# Patient Record
Sex: MALE | Race: WHITE | NOT HISPANIC OR LATINO | URBAN - METROPOLITAN AREA
[De-identification: names, ages, dates, MRNs, and addresses within clinical notes are randomized per-mention and may not be internally consistent; named-entity substitution may affect disease eponyms.]

---

## 2018-01-08 NOTE — PATIENT DISCUSSION
(H35.361) Drusen (degenerative) of macula, right eye - Assesment : Examination revealed Drusen OD. - Plan : Monitor for changes. Advised patient to call our office with decreased vision or increased symptoms.

## 2018-01-08 NOTE — PATIENT DISCUSSION
(E11.9) Type 2 diabetes mellitus without complications - Assesment : Examination reveals Type 2 Diabetes mellitus without ocular complications. - Plan : Continue following with PCP for routine care. Stressed importance of keeping A1c levels below 7.0 and monitoring blood sugar. Letter explaining today's findings faxed to patient's PCP.  RV 1 year Exam.

## 2018-01-08 NOTE — PATIENT DISCUSSION
(Z24.930) Keratoconjunct sicca, not specified as Sjogren's, bilateral - Assesment : Examination revealed Dry Eye Syndrome OU. - Plan : Recommend artificial tears 2-3 times daily OU. Monitor for changes. Advised patient to call our office with decreased vision or increased symptoms.

## 2018-01-08 NOTE — PATIENT DISCUSSION
(U87.786) Other vitreous opacities, bilateral - Assesment : Examination revealed vitreous floaters OD>OS. - Plan : Monitor for changes.

## 2018-01-08 NOTE — PATIENT DISCUSSION
(C61.085) Vitreous degeneration, left eye - Assesment : Examination revealed PVD OS. - Plan : Monitor for changes. Advised patient to call our office with decreased vision or an increase in flashes and/or floaters.

## 2018-03-09 ENCOUNTER — IMPORTED ENCOUNTER (OUTPATIENT)
Dept: URBAN - METROPOLITAN AREA CLINIC 50 | Facility: CLINIC | Age: 73
End: 2018-03-09

## 2018-05-03 ENCOUNTER — IMPORTED ENCOUNTER (OUTPATIENT)
Dept: URBAN - METROPOLITAN AREA CLINIC 50 | Facility: CLINIC | Age: 73
End: 2018-05-03

## 2019-02-27 NOTE — PATIENT DISCUSSION
(O89.393) Keratoconjunct sicca, not specified as Sjogren's, bilateral - Assesment : Examination revealed Dry Eye Syndrome OU. - Plan : Continue artificial tears 2-3 times daily OU. Monitor for changes. Advised patient to call our office with decreased vision or increased symptoms.

## 2019-02-27 NOTE — PATIENT DISCUSSION
(E11.9) Type 2 diabetes mellitus without complications - Assesment : Examination reveals Type 2 Diabetes mellitus without ocular complications. - Plan : Continue following with PCP for routine care. Stressed importance of keeping A1c levels below 7.0 and monitoring blood sugar. Letter explaining today's findings faxed to patient's PCP. RTC 1 month  TN CK/ OCT nerve/Pachy.

## 2019-02-27 NOTE — PATIENT DISCUSSION
(H40.013) Open angle with borderline findings, low risk, bilateral - Assesment : Examination revealed suspicion for Open Angle Glaucoma,  based on high IOP  OU and generous cupping OU. - Plan : Monitor for changes. Advised patient to call our office when decreased vision or increased eye pain.

## 2019-05-02 ENCOUNTER — IMPORTED ENCOUNTER (OUTPATIENT)
Dept: URBAN - METROPOLITAN AREA CLINIC 50 | Facility: CLINIC | Age: 74
End: 2019-05-02

## 2019-09-11 ENCOUNTER — IMPORTED ENCOUNTER (OUTPATIENT)
Dept: URBAN - METROPOLITAN AREA CLINIC 50 | Facility: CLINIC | Age: 74
End: 2019-09-11

## 2019-09-24 ENCOUNTER — IMPORTED ENCOUNTER (OUTPATIENT)
Dept: URBAN - METROPOLITAN AREA CLINIC 50 | Facility: CLINIC | Age: 74
End: 2019-09-24

## 2019-09-24 NOTE — PATIENT DISCUSSION
"""Start lotemax SM both eyes twice a day ."" ""Start Cool compresses both eyes twice a day ."" ""Start Zaditor both eyes twice a day . """

## 2019-10-22 ENCOUNTER — IMPORTED ENCOUNTER (OUTPATIENT)
Dept: URBAN - METROPOLITAN AREA CLINIC 50 | Facility: CLINIC | Age: 74
End: 2019-10-22

## 2019-10-22 NOTE — PATIENT DISCUSSION
"""Continue Cool compresses both eyes twice a day ."" ""Continue Zaditor both eyes twice a day . """

## 2020-05-07 ENCOUNTER — IMPORTED ENCOUNTER (OUTPATIENT)
Dept: URBAN - METROPOLITAN AREA CLINIC 50 | Facility: CLINIC | Age: 75
End: 2020-05-07

## 2020-05-07 NOTE — PATIENT DISCUSSION
"""Use artificial tears 2-4 times a day in both eyes (Systane Complete or Refresh Advanced). "" ""Continue Zaditor both eyes twice a day . """

## 2021-04-18 ASSESSMENT — VISUAL ACUITY
OD_BAT: 20/20
OD_CC: J1+@ 17 IN
OD_CC: 20/20-1
OS_CC: 20/20
OD_CC: 20/20
OD_BAT: 20/70
OD_CC: J1+
OS_CC: 20/20-1
OS_BAT: 20/20
OS_CC: 20/20
OS_CC: J1+
OD_CC: 20/20-2
OS_CC: 20/20-
OD_OTHER: 20/60. 20/80.
OD_OTHER: 20/70. 20/100.
OD_CC: J1+
OS_OTHER: 20/20. 20/40.
OS_CC: 20/20
OS_CC: J1+@ 17 IN
OS_BAT: 20/60
OS_BAT: 20/70
OD_CC: 20/20-
OD_CC: 20/20-1
OD_OTHER: 20/20. 20/40.
OS_OTHER: 20/70. 20/100.
OS_OTHER: 20/60. 20/80.
OS_CC: J1+
OD_BAT: 20/60

## 2021-04-18 ASSESSMENT — TONOMETRY
OD_IOP_MMHG: 20
OD_IOP_MMHG: 21
OS_IOP_MMHG: 16
OS_IOP_MMHG: 21
OD_IOP_MMHG: 16
OS_IOP_MMHG: 20
OS_IOP_MMHG: 16
OS_IOP_MMHG: 22
OD_IOP_MMHG: 20
OD_IOP_MMHG: 16

## 2021-09-09 ENCOUNTER — ANNUAL COMPREHENSIVE EXAM (OUTPATIENT)
Dept: URBAN - METROPOLITAN AREA CLINIC 49 | Facility: CLINIC | Age: 76
End: 2021-09-09

## 2021-09-09 DIAGNOSIS — H25.13: ICD-10-CM

## 2021-09-09 DIAGNOSIS — H35.363: ICD-10-CM

## 2021-09-09 PROCEDURE — 92014 COMPRE OPH EXAM EST PT 1/>: CPT

## 2021-09-09 ASSESSMENT — TONOMETRY
OD_IOP_MMHG: 16
OS_IOP_MMHG: 17
OD_IOP_MMHG: 17
OS_IOP_MMHG: 17

## 2021-09-09 ASSESSMENT — VISUAL ACUITY
OS_GLARE: 20/40
OS_CC: 20/20-2
OD_GLARE: 20/40
OU_CC: J1+
OD_GLARE: 20/40
OD_CC: 20/20
OS_GLARE: 20/40

## 2021-09-09 ASSESSMENT — PACHYMETRY
OS_CT_UM: 550
OD_CT_UM: 565

## 2022-10-25 ENCOUNTER — COMPREHENSIVE EXAM (OUTPATIENT)
Dept: URBAN - METROPOLITAN AREA CLINIC 53 | Facility: CLINIC | Age: 77
End: 2022-10-25

## 2022-10-25 DIAGNOSIS — H43.813: ICD-10-CM

## 2022-10-25 DIAGNOSIS — H35.363: ICD-10-CM

## 2022-10-25 DIAGNOSIS — H25.13: ICD-10-CM

## 2022-10-25 PROCEDURE — 92015 DETERMINE REFRACTIVE STATE: CPT

## 2022-10-25 PROCEDURE — 92134 CPTRZ OPH DX IMG PST SGM RTA: CPT

## 2022-10-25 PROCEDURE — 92014 COMPRE OPH EXAM EST PT 1/>: CPT

## 2022-10-25 ASSESSMENT — VISUAL ACUITY
OD_CC: 20/25
OS_GLARE: 20/25
OU_CC: J1+
OS_GLARE: 20/25
OD_GLARE: 20/30
OD_GLARE: 20/30
OS_CC: 20/20

## 2022-10-25 ASSESSMENT — TONOMETRY
OS_IOP_MMHG: 15
OD_IOP_MMHG: 12
OD_IOP_MMHG: 11
OS_IOP_MMHG: 15

## 2023-11-08 ENCOUNTER — COMPREHENSIVE EXAM (OUTPATIENT)
Dept: URBAN - METROPOLITAN AREA CLINIC 49 | Facility: LOCATION | Age: 78
End: 2023-11-08

## 2023-11-08 DIAGNOSIS — H25.813: ICD-10-CM

## 2023-11-08 DIAGNOSIS — H04.123: ICD-10-CM

## 2023-11-08 DIAGNOSIS — H35.363: ICD-10-CM

## 2023-11-08 DIAGNOSIS — H43.813: ICD-10-CM

## 2023-11-08 DIAGNOSIS — H43.823: ICD-10-CM

## 2023-11-08 PROCEDURE — 92015 DETERMINE REFRACTIVE STATE: CPT

## 2023-11-08 PROCEDURE — 99214 OFFICE O/P EST MOD 30 MIN: CPT

## 2023-11-08 PROCEDURE — 92134 CPTRZ OPH DX IMG PST SGM RTA: CPT

## 2023-11-08 ASSESSMENT — TONOMETRY
OS_IOP_MMHG: 16
OD_IOP_MMHG: 15
OS_IOP_MMHG: 16
OD_IOP_MMHG: 16

## 2023-11-08 ASSESSMENT — VISUAL ACUITY
OS_GLARE: 20/30
OS_CC: 20/25-2
OD_GLARE: 20/30
OD_CC: J1-2
OU_CC: J1+/-
OD_GLARE: 20/30
OS_CC: J1
OS_GLARE: 20/40
OD_CC: 20/20
OU_CC: 20/20

## 2024-11-13 ENCOUNTER — COMPREHENSIVE EXAM (OUTPATIENT)
Dept: URBAN - METROPOLITAN AREA CLINIC 49 | Facility: LOCATION | Age: 79
End: 2024-11-13

## 2024-11-13 DIAGNOSIS — H52.4: ICD-10-CM

## 2024-11-13 DIAGNOSIS — H43.813: ICD-10-CM

## 2024-11-13 DIAGNOSIS — H25.813: ICD-10-CM

## 2024-11-13 DIAGNOSIS — H35.363: ICD-10-CM

## 2024-11-13 DIAGNOSIS — D31.91: ICD-10-CM

## 2024-11-13 DIAGNOSIS — H04.123: ICD-10-CM

## 2024-11-13 DIAGNOSIS — D31.92: ICD-10-CM

## 2024-11-13 PROCEDURE — 92134 CPTRZ OPH DX IMG PST SGM RTA: CPT

## 2024-11-13 PROCEDURE — 99214 OFFICE O/P EST MOD 30 MIN: CPT

## 2025-07-23 ENCOUNTER — PROBLEM FOCUSED VISIT (OUTPATIENT)
Age: 80
End: 2025-07-23

## 2025-07-23 DIAGNOSIS — H35.363: ICD-10-CM

## 2025-07-23 DIAGNOSIS — H52.4: ICD-10-CM

## 2025-07-23 DIAGNOSIS — H25.813: ICD-10-CM

## 2025-07-23 PROCEDURE — 99214 OFFICE O/P EST MOD 30 MIN: CPT

## 2025-07-23 PROCEDURE — 92134 CPTRZ OPH DX IMG PST SGM RTA: CPT

## 2025-07-23 PROCEDURE — 92015 DETERMINE REFRACTIVE STATE: CPT
